# Patient Record
Sex: FEMALE | URBAN - METROPOLITAN AREA
[De-identification: names, ages, dates, MRNs, and addresses within clinical notes are randomized per-mention and may not be internally consistent; named-entity substitution may affect disease eponyms.]

---

## 2017-07-05 LAB — MAMMOGRAM: NORMAL

## 2018-01-15 ENCOUNTER — THERAPY VISIT (OUTPATIENT)
Dept: PHYSICAL THERAPY | Facility: CLINIC | Age: 68
End: 2018-01-15
Payer: MEDICARE

## 2018-01-15 DIAGNOSIS — M25.512 ACUTE PAIN OF LEFT SHOULDER: Primary | ICD-10-CM

## 2018-01-15 PROCEDURE — G8985 CARRY GOAL STATUS: HCPCS | Mod: GP | Performed by: PHYSICAL THERAPIST

## 2018-01-15 PROCEDURE — 97162 PT EVAL MOD COMPLEX 30 MIN: CPT | Mod: GP | Performed by: PHYSICAL THERAPIST

## 2018-01-15 PROCEDURE — G8984 CARRY CURRENT STATUS: HCPCS | Mod: GP | Performed by: PHYSICAL THERAPIST

## 2018-01-15 PROCEDURE — 97110 THERAPEUTIC EXERCISES: CPT | Mod: GP | Performed by: PHYSICAL THERAPIST

## 2018-01-15 NOTE — PROGRESS NOTES
[facility] Initial Evaluation  Subjective:  HPI Comments: [facility] Initial Evaluation.    Mrs. Lei is an active 67 year old retired woman, who spends many hours volunteering. 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 They did not provide box cutters. The next day she felt significant anterior left shoulder pain, which has gotten progressively worse.The patient is right dominant, and has been coping with her shoulder pain since its onset. Mica rates her pain as 8/10. She cannot elevate her arm, and is now having trouble sleeping. She is diabetic, and during a visit with her PA for her blood work - shared this concern. Given a positive drop arm test, severely limited AROM, and PROM > AROM, I have asked Mica to contact her PA for follow up. Concerned if there is a RCT. We will keep this chart open.    Patient is a 67 year old female presenting with rehab left shoulder hpi. The history is provided by the patient. No  was used.   Mica Lei is a 67 year old female with a left shoulder condition.  Condition occurred with:  Repetition/overuse.  Condition occurred: in the community.  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    Patient reports pain:  Anterior and lateral.    Pain is described as aching, stabbing, shooting and sharp and is constant and reported as 8/10.  Associated symptoms:  Loss of strength and loss of motion/stiffness. Pain is the same all the time.  Symptoms are exacerbated by using arm at shoulder level, certain positions, carrying, using arm behind back, lifting and lying on extremity and relieved by heat.  Since onset symptoms are gradually worsening.        General health as reported by patient is good.                  Barriers include:  None as reported by the patient.    Red flags:  None as reported by the  patient.                        Objective:  Standing Alignment:    Cervical/Thoracic:  Forward head  Shoulder/UE:  Rounded shoulders                  Flexibility/Screens:   Positive screens:  ShoulderNegative screens: Cervical                              Shoulder Evaluation:  ROM:  AROM:    Flexion:  Left:  80    Right:  170    Abduction:  Left: 75   Right:  179    Internal Rotation:  Left:  Sacrum    Right:  T7  External Rotation:  Left:  Cannot put hand behind head    Right:  C4                PROM:    Flexion:  Left:  145          Abduction:  Left:  140        Internal Rotation:  Left:  65      External Rotation:  Left:  60                        Strength:    Flexion: Left:3-/5   Pain:    Right: 4/5     Pain:   Extension:  Left: 4-/5    Pain:    Right: 4/5    Pain:  Abduction:  Left: 3-/5  Pain:    Right: 4/5     Pain:    Internal Rotation:  Left:4-/5     Pain:    Right: 4/5     Pain:  External Rotation:   Left:3-/5     Pain:   Right:4/5     Pain:        Elbow Flexion:  Left:4/5     Pain:    Right:4/5     Pain:    Stability Testing:    Left shoulder stability positive testing:  Apprehension    Special Tests:  Special tests assessed shoulder: ? left RCT.  Left shoulder positive for the following special tests:  Impingement and Rotator cuff tear    Palpation:    Left shoulder tenderness present at:  Biceps and Supraspinatus                                                                            Musculoskeletal:        Left shoulder: She exhibits decreased range of motion, pain and decreased strength.        Arms:      ROS    Assessment/Plan:    Patient is a 67 year old female with left side shoulder complaints.    Patient has the following significant findings with corresponding treatment plan.                Diagnosis 1:  Left shoulder pain  Pain -  hot/cold therapy, manual therapy, self management, education and home program  Decreased ROM/flexibility - manual therapy, therapeutic exercise and home  program  Decreased strength - therapeutic exercise and home program  Impaired muscle performance - neuro re-education and home program  Decreased function - home program    Therapy Evaluation Codes:   1) History comprised of:   Personal factors that impact the plan of care:      None.    Comorbidity factors that impact the plan of care are:      Diabetes, Heart problems, High blood pressure, Overweight and kidney disease.     Medications impacting care: Anti-depressant, Cardiac and High blood pressure.  2) Examination of Body Systems comprised of:   Body structures and functions that impact the plan of care:      Shoulder.   Activity limitations that impact the plan of care are:      Bathing, Cooking, Driving, Dressing, Lifting and Sleeping.  3) Clinical presentation characteristics are:   Evolving/Changing.  4) Decision-Making    Moderate complexity using standardized patient assessment instrument and/or measureable assessment of functional outcome.  Cumulative Therapy Evaluation is: Moderate complexity.    Previous and current functional limitations:  (See Goal Flow Sheet for this information)    Short term and Long term goals: (See Goal Flow Sheet for this information)     Communication ability:  Patient appears to be able to clearly communicate and understand verbal and written communication and follow directions correctly.  Treatment Explanation - The following has been discussed with the patient:   RX ordered/plan of care  Anticipated outcomes  Possible risks and side effects  This patient would benefit from PT intervention to resume normal activities.   Rehab potential is fair.    Frequency:  Have asked the patient to return to see her PA for further assessment. Concerned about a RCT. If clear - frequency would be 1x a week, once daily.  Duration:  for 8 weeks  Discharge Plan:  Achieve all LTG.  Independent in home treatment program.  Reach maximal therapeutic benefit.    Please refer to the daily flowsheet  for treatment today, total treatment time and time spent performing 1:1 timed codes.

## 2018-01-15 NOTE — LETTER
DEPARTMENT OF HEALTH AND HUMAN SERVICES  [facility]    PLAN/UPDATED PLAN OF PROGRESS FOR OUTPATIENT REHABILITATION    PATIENTS NAME:  Mica Lei     : 1950    PROVIDER NUMBER:    [id number]    [facility]  [id number]    [facility]    MEDICAL RECORD NUMBER: [id number]     START OF CARE DATE:  SOC Date: 01/15/18   TYPE:  PT    PRIMARY/TREATMENT DIAGNOSIS: (Pertinent Medical Diagnosis)  Acute pain of left shoulder    VISITS FROM START OF CARE:  Rxs Used: 1     [facility] Initial Evaluation  Subjective:  HPI Comments: [facility] Initial [facility].    Mrs. Lei is an active 67 year old retired woman, who spends many hours volunteering. 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 They did not provide box cutters. The next day she felt significant anterior left shoulder pain, which has gotten progressively worse.The patient is right dominant, and has been coping with her shoulder pain since its onset. Mica rates her pain as 8/10. She cannot elevate her arm, and is now having trouble sleeping. She is diabetic, and during a visit with her PA for her blood work - shared this concern. Given a positive drop arm test, severely limited AROM, and PROM > AROM, I have asked Mica to contact her PA for follow up. Concerned if there is a RCT. We will keep this chart open.    Mica Lei is a 67 year old female with a left shoulder condition.  Condition occurred with:  Repetition/overuse.  Condition occurred: in the community.  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    Patient reports pain:  Anterior and lateral.    Pain is described as aching, stabbing, shooting and sharp and is constant and reported as 8/10.  Associated symptoms:  Loss of strength and loss of motion/stiffness. Pain is the same all the time.   Symptoms are exacerbated by using arm at shoulder level, certain positions, carrying, using arm behind back, lifting and lying on extremity and relieved by heat.  Since onset symptoms are gradually worsening.  General health as reported by patient is good.                 Pertinent medical history includes:  Diabetes, overweight, high blood pressure, heart problems, kidney disease and other.  Medical allergies: yes (latex).    Current medications:  Cardiac medication, anti-depressants and high blood pressure medication.  Current occupation is Retired.  Barriers include:  None as reported by the patient.  Red flags:  None as reported by the patient.      PATIENTS NAME:  Mica Lei   : 1950-Page 2                 Objective:  Standing Alignment:    Cervical/Thoracic:  Forward head  Shoulder/UE:  Rounded shoulders  Flexibility/Screens:   Positive screens:  ShoulderNegative screens: Cervical   Shoulder Evaluation:  ROM:  AROM:    Flexion:  Left:  80    Right:  170  Abduction:  Left: 75   Right:  179  Internal Rotation:  Left:  Sacrum    Right:  T7  External Rotation:  Left:  Cannot put hand behind head    Right:  C4  PROM:    Flexion:  Left:  145        Abduction:  Left:  140      Internal Rotation:  Left:  65      External Rotation:  Left:  60      Strength:    Flexion: Left:3-/5   Pain:    Right: 4/5     Pain:   Extension:  Left: 4-/5    Pain:    Right: 4/5    Pain:  Abduction:  Left: 3-/5  Pain:    Right: 4/5     Pain:  Internal Rotation:  Left:4-/5     Pain:    Right: 4/5     Pain:  External Rotation:   Left:3-/5     Pain:   Right:4/5     Pain:    Elbow Flexion:  Left:4/5     Pain:    Right:4/5     Pain:  Stability Testing:    Left shoulder stability positive testing:  Apprehension  Special Tests:  Special tests assessed shoulder: ? left RCT.  Left shoulder positive for the following special tests:  Impingement and Rotator cuff tear  Palpation:    Left shoulder tenderness present at:  Biceps and  Supraspinatus                      Musculoskeletal:        Left shoulder: She exhibits decreased range of motion, pain and decreased strength.        Arms:      PATIENTS NAME:  Mica Lei   : 1950-Page 3      Assessment/Plan:    Patient is a 67 year old female with left side shoulder complaints.    Patient has the following significant findings with corresponding treatment plan.                Diagnosis 1:  Left shoulder pain  Pain -  hot/cold therapy, manual therapy, self management, education and home program  Decreased ROM/flexibility - manual therapy, therapeutic exercise and home program  Decreased strength - therapeutic exercise and home program  Impaired muscle performance - neuro re-education and home program  Decreased function - home program    Therapy Evaluation Codes:   1) History comprised of:   Personal factors that impact the plan of care:      None.    Comorbidity factors that impact the plan of care are:      Diabetes, Heart problems, High blood pressure, Overweight and kidney disease.     Medications impacting care: Anti-depressant, Cardiac and High blood pressure.  2) Examination of Body Systems comprised of:   Body structures and functions that impact the plan of care:      Shoulder.   Activity limitations that impact the plan of care are:      Bathing, Cooking, Driving, Dressing, Lifting and Sleeping.  3) Clinical presentation characteristics are:   Evolving/Changing.  4) Decision-Making    Moderate complexity using standardized patient assessment instrument and/or measureable assessment of functional outcome.  Cumulative Therapy Evaluation is: Moderate complexity.    Previous and current functional limitations:  (See Goal Flow Sheet for this information)    Short term and Long term goals: (See Goal Flow Sheet for this information)     Communication ability:  Patient appears to be able to clearly communicate and understand verbal and written communication and follow directions  "correctly.  Treatment Explanation - The following has been discussed with the patient:   RX ordered/plan of care  Anticipated outcomes  Possible risks and side effects  This patient would benefit from PT intervention to resume normal activities.   Rehab potential is fair.    Frequency:  Have asked the patient to return to see her PA for further assessment. Concerned about a RCT. If clear - frequency would be 1x a week, once daily.  Duration:  for 8 weeks  Discharge Plan:  Achieve all LTG.  Independent in home treatment program.  Reach maximal therapeutic benefit.    PATIENTS NAME:  Mica Lei   : 1950-Page 4      Caregiver Signature/Credentials _____________________________ Date ________       Treating Provider: Frances Otero DPT   I have reviewed and certified the need for these services and plan of treatment while under my care.        PHYSICIAN'S SIGNATURE:   _________________________________________  Date___________   Katelynn Fu PA-C    Certification period:  Beginning of Cert date period: 01/15/18 to  End of Cert period date: 18     Functional Level Progress Report: Please see attached \"Goal Flow sheet for Functional level.\"    ____X____ Continue Services or       ________ DC Services                Service dates: From  SOC Date: 01/15/18 date to present                         "

## 2018-01-16 NOTE — PROGRESS NOTES
[facility] Initial Evaluation  Subjective:  Patient is a 67 year old female presenting with rehab left ankle/foot hpi.                                      Pertinent medical history includes:  Diabetes, overweight, high blood pressure, heart problems, kidney disease and other.  Medical allergies: yes (latex).    Current medications:  Cardiac medication, anti-depressants and high blood pressure medication.  Current occupation is Retired.                                    Objective:  System    Physical Exam    General     ROS    Assessment/Plan:

## 2018-03-14 PROBLEM — M25.512 ACUTE PAIN OF LEFT SHOULDER: Status: RESOLVED | Noted: 2018-01-15 | Resolved: 2018-01-16

## 2018-04-04 ENCOUNTER — TRANSFERRED RECORDS (OUTPATIENT)
Dept: HEALTH INFORMATION MANAGEMENT | Facility: CLINIC | Age: 68
End: 2018-04-04

## 2018-04-06 ENCOUNTER — TRANSFERRED RECORDS (OUTPATIENT)
Dept: HEALTH INFORMATION MANAGEMENT | Facility: CLINIC | Age: 68
End: 2018-04-06

## 2018-07-06 ENCOUNTER — RECORDS - HEALTHEAST (OUTPATIENT)
Dept: LAB | Facility: CLINIC | Age: 68
End: 2018-07-06

## 2018-07-09 LAB
BACTERIA SPEC CULT: ABNORMAL
BACTERIA SPEC CULT: NORMAL
GRAM STAIN RESULT: ABNORMAL

## 2018-07-10 LAB
BACTERIA SPEC CULT: ABNORMAL
BACTERIA SPEC CULT: ABNORMAL
GRAM STAIN RESULT: ABNORMAL
GRAM STAIN RESULT: ABNORMAL

## 2018-08-01 ENCOUNTER — TRANSFERRED RECORDS (OUTPATIENT)
Dept: HEALTH INFORMATION MANAGEMENT | Facility: CLINIC | Age: 68
End: 2018-08-01

## 2018-09-10 ENCOUNTER — TRANSFERRED RECORDS (OUTPATIENT)
Dept: HEALTH INFORMATION MANAGEMENT | Facility: CLINIC | Age: 68
End: 2018-09-10

## 2018-09-19 ENCOUNTER — TELEPHONE (OUTPATIENT)
Dept: DERMATOLOGY | Facility: CLINIC | Age: 68
End: 2018-09-19

## 2018-09-19 NOTE — TELEPHONE ENCOUNTER
Dermatology Pre-visit Call:    Reason for visit : blistering dermatosis     Any personal history of skin cancers: No    Was the patient referred: Yes    If the patient was referred, are records obtained: Yes. (If no, then obtain records).    Has the patient seen a dermatologist in the past: Yes. (If yes, obtain records)    Patient Reminders Given:  --Please, make sure you bring an updated list of your medications.   --Plan on being in our facility for approximately one hour, this includes the registration process, office visit, education and check-out process.  If you are having a procedure, more time may be required.     --If you are having a procedure, please, present 15 minutes early.  --Location reviewed.   --If you need to cancel or reschedule, call XXXX  --We look forward to seeing you in Dermatology Clinic.

## 2018-09-24 ENCOUNTER — PATIENT OUTREACH (OUTPATIENT)
Dept: CARE COORDINATION | Facility: CLINIC | Age: 68
End: 2018-09-24

## 2018-09-25 ENCOUNTER — OFFICE VISIT (OUTPATIENT)
Dept: DERMATOLOGY | Facility: CLINIC | Age: 68
End: 2018-09-25
Payer: COMMERCIAL

## 2018-09-25 DIAGNOSIS — R21 RASH AND NONSPECIFIC SKIN ERUPTION: Primary | ICD-10-CM

## 2018-09-25 RX ORDER — LISINOPRIL 20 MG/1
TABLET ORAL
COMMUNITY
Start: 2018-07-04

## 2018-09-25 RX ORDER — SERTRALINE HYDROCHLORIDE 100 MG/1
TABLET, FILM COATED ORAL
COMMUNITY
Start: 2018-09-15

## 2018-09-25 RX ORDER — FUROSEMIDE 20 MG/1
5 TABLET ORAL DAILY
COMMUNITY
Start: 2018-05-02

## 2018-09-25 RX ORDER — LIRAGLUTIDE 6 MG/ML
INJECTION SUBCUTANEOUS
COMMUNITY
Start: 2018-09-15

## 2018-09-25 RX ORDER — BETAMETHASONE DIPROPIONATE 0.5 MG/G
OINTMENT, AUGMENTED TOPICAL 2 TIMES DAILY
Qty: 50 G | Refills: 0 | Status: SHIPPED | OUTPATIENT
Start: 2018-09-25 | End: 2018-10-31

## 2018-09-25 RX ORDER — METOPROLOL TARTRATE 50 MG
TABLET ORAL
COMMUNITY
Start: 2018-08-20

## 2018-09-25 RX ORDER — ATORVASTATIN CALCIUM 20 MG/1
TABLET, FILM COATED ORAL
COMMUNITY
Start: 2018-08-27

## 2018-09-25 ASSESSMENT — PAIN SCALES - GENERAL: PAINLEVEL: NO PAIN (0)

## 2018-09-25 NOTE — PROGRESS NOTES
"[facility] Dermatology Note      Dermatology Problem List:  # Rash NOS - Leading differential diagnosis is acrodermatitis continua of [place].   - Biopsy 4/2018 showed psoriasiform dermatitis; slides to be obtained for review.  - Start aug betamethasone ointment BID.    Encounter Date: Sep 25, 2018    CC:   Chief Complaint   Patient presents with     Derm Problem     Mica is here today for a sore on her right 3 digit. She states \" I had surgery on the toe and I have been to a few different doctors with not help.\"     History of Present Illness:  Ms. Mica Lei is a 68 year old female who presents as a new patient for evaluation of a skin rash referred by Dr. Tong Goodson and Dr. Terrell Alicia. History is taken from the patient, chart review, and referral notes.     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 Right after the surgery, she noticed skin changes over her 3rd digit initially. She describes initially purple colored skin that then became raw. It also slowly spread over time to her plantar surface. She also reports swelling and drainage. She has remained afebrile and without systemic symptoms. She was started on a short course of oral antibiotics (unclear name) which provided minimal clinical improvement. X-ray of the bone showed mild swelling but otherwise unremarkable. CBC was normal and CRP was mildly elevated at 0.8. Skin biopsy performed April 2018 showed psoriasiform dermatitis, although we do not have the path report for that.    She has been treated by multiple modalities including: topical antifungal/steroid combination, a course of oral antifungals, mid-high potency topical steroids, and vinegar soaks. None of which were very helpful per the patient. She went to an orthopedic surgeon in June or July and had one of the rods removed in the right " "3rd toe in hopes this would help her symptoms but it did not. There was a note that there were \"signs of infection\" during that procedure, but nothing further than that. Cultures taken at that time were negative.     The patient reports no personal history of skin problems. She has no family history of any skin conditions, including psoriasis.    Past Medical History:   Patient Active Problem List   Diagnosis   (none) - all problems resolved or deleted     No past medical history on file.  No past surgical history on file.    Social History:   reports that she has never smoked. She has never used smokeless tobacco.    Family History:  No family history on file.    Medications:  Current Outpatient Prescriptions   Medication Sig Dispense Refill     atorvastatin (LIPITOR) 20 MG tablet        canagliflozin (INVOKANA) 300 MG tablet        furosemide (LASIX) 10 mg TABS half-tab Take 5 mg by mouth daily       Insulin Aspart (NOVOLOG FLEXPEN SC)        Insulin Detemir (LEVEMIR FLEXTOUCH SC)        liraglutide (VICTOZA PEN) 18 MG/3ML soln        lisinopril (PRINIVIL/ZESTRIL) 20 MG tablet        metFORMIN (GLUCOPHAGE) 1000 MG tablet        metoprolol tartrate (LOPRESSOR) 50 MG tablet        sertraline (ZOLOFT) 100 MG tablet           Allergies   Allergen Reactions     Penicillins Hives     Sulfa Drugs Rash       Review of Systems:  -As per HPI  -Constitutional: The patient denies fatigue, fevers, chills, unintended weight loss, and night sweats.  -HEENT: Patient denies nonhealing oral sores.  -Skin: As above in HPI. No additional skin concerns.    Physical exam:  Vitals: There were no vitals taken for this visit.  GEN: This is a well developed, well-nourished female in no acute distress, in a pleasant mood.    SKIN: Focused examination of the hands and feet was performed.  -Erosion over right 3rd digit with some scaling and crusting at erosion border with loss of the nail.   -Scaling erosive plaque with hemorrhagic crusting " at base of right great toe, base of 2nd toe. Small scaling plaque on right plantar surface which was previous biopsy site.  -Longitudinal ridging in bilateral finger nails, no pitting or oil spots.   -No obvious pustules  -No other lesions of concern on areas examined.                 Impression/Plan:  1. 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 Arising after foot surgery on 10/24/2017, which may suggest an element of koebnerization. Previously biopsied 4/2018 showed psoriasiform dermatitis consistent with psoriasis vs chronic eczematous process. Patient has failed multiple treatment modalities including: topical and oral anti-fungals, mid to high potency topical steroids, and oral antibiotics.    We will work to get the pathology report and the slides for evaluation    Will start augmented betamethasone ointment twice a day    Depending on path results, can consider initiation of Humira or cyclosporine at next visit. Briefly reviewed Humira with patient and her .    Follow-up in 1 month.    Dr. Whalen staffed the patient.    Staff Involved:  Resident(Catina Daniels)/Staff(as above)    Staff Physician Comments:   I saw and evaluated the patient with the resident and I agree with the assessment and plan.  I was present for the examination.    Nader Whalen MD  Dermatology Staff Physician  , Department of Dermatology

## 2018-09-25 NOTE — LETTER
"9/25/2018       RE: Mica Lei  [address]  [facility] [address]     Dear Colleague,    Thank you for referring your patient, Mica Lei, to the [facility] DERMATOLOGY at [facility]. Please see a copy of my visit note below.    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 Biopsy 4/2018 showed psoriasiform dermatitis.   -Start aug betamethasone ointment BID, will get outside path report. Plan for Humira at next visit.     Encounter Date: Sep 25, 2018    CC:   Chief Complaint   Patient presents with     Derm Problem     Mica is here today for a sore on her right 3 digit. She states \" I had surgery on the toe and I have been to a few different doctors with not help.\"         History of Present Illness:  Ms. Mica Lei is a 68 year old female who presents as a new patient for evaluation of a skin rash referred by Dr. Tong Goodson and Dr. Terrell Alicia. History is taken from the patient, chart review, and referral notes.     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 Right after the surgery, she noticed skin changes over her 3rd digit initially. She describes initially purple colored skin that then became raw. It also slowly spread over time to her plantar surface. She also reports swelling and drainage. She has remained afebrile and without systemic symptoms. She was started on a short course of oral antibiotics (unclear name) which provided minimal clinical improvement. X-ray of the bone showed mild swelling but otherwise unremarkable. CBC was normal and CRP was mildly elevated at 0.8. Skin biopsy performed April 2018 showed psoriasiform dermatitis, although we do not have the path report for that.    She has been treated by " "multiple modalities including: topical antifungal/steroid combination, a course of oral antifungals, mid-high potency topical steroids, and vinegar soaks. None of which were very helpful per the patient. She went to an orthopedic surgeon in June or July and had one of the rods removed in the right 3rd toe in hopes this would help her symptoms but it did not. There was a note that there were \"signs of infection\" during that procedure, but nothing further than that. Cultures taken at that time were negative.     The patient reports no personal history of skin problems. She has no family history of any skin conditions, including psoriasis.    Past Medical History:   Patient Active Problem List   Diagnosis   (none) - all problems resolved or deleted     No past medical history on file.  No past surgical history on file.    Social History:   reports that she has never smoked. She has never used smokeless tobacco.    Family History:  No family history on file.    Medications:  Current Outpatient Prescriptions   Medication Sig Dispense Refill     atorvastatin (LIPITOR) 20 MG tablet        canagliflozin (INVOKANA) 300 MG tablet        furosemide (LASIX) 10 mg TABS half-tab Take 5 mg by mouth daily       Insulin Aspart (NOVOLOG FLEXPEN SC)        Insulin Detemir (LEVEMIR FLEXTOUCH SC)        liraglutide (VICTOZA PEN) 18 MG/3ML soln        lisinopril (PRINIVIL/ZESTRIL) 20 MG tablet        metFORMIN (GLUCOPHAGE) 1000 MG tablet        metoprolol tartrate (LOPRESSOR) 50 MG tablet        sertraline (ZOLOFT) 100 MG tablet           Allergies   Allergen Reactions     Penicillins Hives     Sulfa Drugs Rash       Review of Systems:  -As per HPI  -Constitutional: The patient denies fatigue, fevers, chills, unintended weight loss, and night sweats.  -HEENT: Patient denies nonhealing oral sores.  -Skin: As above in HPI. No additional skin concerns.    Physical exam:  Vitals: There were no vitals taken for this visit.  GEN: This is a " well developed, well-nourished female in no acute distress, in a pleasant mood.    SKIN: Focused examination of the hands and feet was performed.  -Erosion over right 3rd digit with some scaling and crusting at erosion border with loss of the nail.   -Scaling erosive plaque with hemorrhagic crusting at base of right great toe, base of 2nd toe. Small scaling plaque on right plantar surface which was previous biopsy site.  -Longitudinal ridging in bilateral finger nails, no pitting or oil spots.   -No other lesions of concern on areas examined.                 Impression/Plan:  1. 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 Previously biopsied 4/2018 showed psoriasiform dermatitis consistent with psoriasis vs chronic eczematous process. Patient has failed multiple treatment modalities including: topical and oral anti-fungals, mid to high potency topical steroids, and oral antibiotics.    We will work to get the pathology report and the slides for evaluation    Will start augmented betamethasone ointment twice a day    Depending on path results, can consider initiation of Humira at next visit. Briefly reviewed Humira with patient and her .      Follow-up in 1 month.      Dr. Whalen staffed the patient.    Staff Involved:  Resident(Catina Daniels)/Staff(as above)      Again, thank you for allowing me to participate in the care of your patient.      Sincerely,    Nader Whalen MD

## 2018-09-25 NOTE — NURSING NOTE
"Chief Complaint   Patient presents with     Derm Problem     Mica is here today for a sore on her right 3 digit. She states \" I had surgery on the toe and I have been to a few different doctors with not help.\"     Lelo Gilliland, RMMONICA  "

## 2018-09-25 NOTE — MR AVS SNAPSHOT
After Visit Summary   9/25/2018    Mica Lei    MRN: [id number]           Patient Information     Date Of Birth          1950        Visit Information        Provider Department      9/25/2018 12:25 PM Nader Whalen MD M [facility] Dermatology        Today's Diagnoses     Rash and nonspecific skin eruption    -  1       Follow-ups after your visit        Your next 10 appointments already scheduled     Oct 30, 2018 11:55 AM CDT   (Arrive by 11:40 AM)   Return Visit with MD FARZAD Reyes [facility] Dermatology ([facility])    [address] [place] [address] [phone]              Who to contact     Please call your clinic at [phone] to:    Ask questions about your health    Make or cancel appointments    Discuss your medicines    Learn about your test results    Speak to your doctor            Additional Information About Your Visit        MyChart Information     [employer] gives you secure access to your electronic health record. If you see a primary care provider, you can also send messages to your care team and make appointments. If you have questions, please call your primary care clinic.  If you do not have a primary care provider, please call [phone] and they will assist you.      [facility] is an electronic gateway that provides easy, online access to your medical records. With [facility], you can request a clinic appointment, read your test results, renew a prescription or communicate with your care team.     To access your existing account, please contact your [facility] or call [phone] for assistance.        Care EveryWhere ID     This is your Care EveryWhere ID. This could be used by other organizations to access your [place] medical records  [id number]         Blood Pressure from Last 3 Encounters:   No data found for BP    Weight from Last 3 Encounters:   No data found for Wt               Today, you had the following     No orders found for display         Today's Medication Changes          These changes are accurate as of 9/25/18  1:15 PM.  If you have any questions, ask your nurse or doctor.               Start taking these medicines.        Dose/Directions    augmented betamethasone dipropionate 0.05 % ointment   Commonly known as:  DIPROLENE-AF   Used for:  Rash and nonspecific skin eruption   Started by:  Nader Whalen MD        Apply topically 2 times daily   Quantity:  50 g   Refills:  0            Where to get your medicines      These medications were sent to [facility] [address] [place] [address] [address] [facility]  [address] [facility] [address] [facility] [address]     Phone:  [phone]     augmented betamethasone dipropionate 0.05 % ointment                Primary Care Provider Office Phone # Fax #    Katelynn Tank [phone]       [address] [address]        Equal Access to Services     SARITA [facility] CenterSALAZAR AH: Hadii aad ku hadasho Soomaali, waaxda luqadaha, qaybta kaalmada adeegyada, waxay idiin hayshayyn bud dockery . So [facility] [phone].    ATENCIÓN: Si habla español, tiene a sumner disposición servicios gratuitos de asistencia lingüística. [facility] [phone].    We comply with applicable federal civil rights laws and [place] laws. We do not discriminate on the basis of race, color, national origin, age, disability, sex, sexual orientation, or gender identity.            Thank you!     Thank you for choosing [facility] DERMATOLOGY  for your care. Our goal is always to provide you with excellent care. Hearing back from our patients is one way we can continue to improve our services. Please take a few minutes to complete the written survey that you may receive in the mail after your visit with us. Thank you!             Your Updated Medication List - Protect others around you: Learn how to safely use, store  and throw away your medicines at www.disposemymeds.org.          This list is accurate as of 9/25/18  1:15 PM.  Always use your most recent med list.                   Brand Name Dispense Instructions for use Diagnosis    atorvastatin 20 MG tablet    LIPITOR          augmented betamethasone dipropionate 0.05 % ointment    DIPROLENE-AF    50 g    Apply topically 2 times daily    Rash and nonspecific skin eruption       furosemide 10 mg Tabs half-tab    LASIX     Take 5 mg by mouth daily        INVOKANA 300 MG tablet   Generic drug:  canagliflozin           LEVEMIR FLEXTOUCH SC           lisinopril 20 MG tablet    PRINIVIL/ZESTRIL          metFORMIN 1000 MG tablet    GLUCOPHAGE          metoprolol tartrate 50 MG tablet    LOPRESSOR          NOVOLOG FLEXPEN SC           sertraline 100 MG tablet    ZOLOFT          VICTOZA PEN 18 MG/3ML soln   Generic drug:  liraglutide

## 2018-10-12 ENCOUNTER — TELEPHONE (OUTPATIENT)
Dept: DERMATOLOGY | Facility: CLINIC | Age: 68
End: 2018-10-12

## 2018-10-12 NOTE — TELEPHONE ENCOUNTER
[facility]    Phone Message    May a detailed message be left on voicemail: yes    Reason for Call: Other: Dr. Curry from [facility] called wanting to touch base w/ Dr. Whalen about their mutual. Dr. Curry stated pt had a skin reaction to a surgery (not done by him) where she was referred to us, and he wanted to collaborate w/ Dr. Whalen to see how to best treat pt moving forward.     Action Taken: Message routed to:  [facility] ([facility] Derm

## 2018-10-12 NOTE — TELEPHONE ENCOUNTER
I called Dr. Curry and I let him know that Dr. Whalen would not be back in the offie till Tuesday, he stated that it was fine and he looks forward to talking with him then.   Mar Rosas, CMA

## 2018-10-30 ENCOUNTER — OFFICE VISIT (OUTPATIENT)
Dept: DERMATOLOGY | Facility: CLINIC | Age: 68
End: 2018-10-30
Payer: COMMERCIAL

## 2018-10-30 DIAGNOSIS — L30.9 DERMATITIS: Primary | ICD-10-CM

## 2018-10-30 PROBLEM — E11.9 DIABETES (H): Status: ACTIVE | Noted: 2018-10-30

## 2018-10-30 ASSESSMENT — PAIN SCALES - GENERAL: PAINLEVEL: NO PAIN (0)

## 2018-10-30 NOTE — NURSING NOTE
Dermatology Rooming Note    Mica Lei's goals for this visit include:   Chief Complaint   Patient presents with     Derm Problem    Karla Garcia LPN

## 2018-10-30 NOTE — LETTER
"10/30/2018       RE: Mica Lei  [address] [address] [address]     Dear Colleague,    Thank you for referring your patient, Mica Lei, to the [facility] DERMATOLOGY at [facility]. Please see a copy of my visit note below.    [facility] Dermatology Note      Dermatology Problem List:  1. 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   - Betamethasone ointment BID started 9/25/2018    Encounter Date: Oct 30, 2018    CC:  Chief Complaint   Patient presents with     Derm Problem     Mica is here for follow up with her toe.          History of Present Illness:  Ms. Mica Lei is a 68 year old female with a past medical history significant for Type 2 diabetes who presents as a follow-up for a right foot lesion that developed after arthrodesis of the 3rd, 4th, and 5th digits for hammertoes by podiatry on October 24, 2017. The patient was last seen 9/25/2018 when she was started on betamethasone ointment twice daily and initiation of treatment with Humira or cyclosporine was discussed.    Today, Mica states that the lesions on her foot are a little bit better. She has fewer open sores, particularly over the right great toe and base of the 2nd toe. However, the 3rd toe still bleeds every morning and it was entirely purple this morning. She has also noticed some foul odors coming from the toe. She stated that her feet are extremely dry and that she applies GoldBond Ultimate lotion for diabetes. She states that her diabetes is \"finally under control\" on insulin and metformin. She would prefer not to start treatment with Humira or cyclosporine given possible side effects and desire to minimize medication regimen.     No other questions or concerns.      Past Medical History:   Patient Active Problem List   Diagnosis   (none) - all " problems resolved or deleted     History reviewed. No pertinent past medical history.  History reviewed. No pertinent surgical history.    Social History:  Patient  reports that she has never smoked. She has never used smokeless tobacco.    Family History:  History reviewed. No pertinent family history.    Medications:  Current Outpatient Prescriptions   Medication Sig Dispense Refill     atorvastatin (LIPITOR) 20 MG tablet        augmented betamethasone dipropionate (DIPROLENE-AF) 0.05 % ointment Apply topically 2 times daily 50 g 0     canagliflozin (INVOKANA) 300 MG tablet        furosemide (LASIX) 10 mg TABS half-tab Take 5 mg by mouth daily       Insulin Aspart (NOVOLOG FLEXPEN SC)        Insulin Detemir (LEVEMIR FLEXTOUCH SC)        liraglutide (VICTOZA PEN) 18 MG/3ML soln        lisinopril (PRINIVIL/ZESTRIL) 20 MG tablet        metFORMIN (GLUCOPHAGE) 1000 MG tablet        metoprolol tartrate (LOPRESSOR) 50 MG tablet        sertraline (ZOLOFT) 100 MG tablet        Allergies   Allergen Reactions     Penicillins Hives     Sulfa Drugs Rash         Review of Systems:  -As per HPI  -Constitutional: The patient denies fatigue, fevers, chills, unintended weight loss, and night sweats.  -HEENT: Patient denies nonhealing oral sores.  -Skin: As above in HPI. No additional skin concerns.    Physical exam:  Vitals: There were no vitals taken for this visit.  GEN: This is a well developed, well-nourished female in no acute distress, in a pleasant mood.    SKIN: Focused examination of the right foot, arms, legs, and back was performed.  - Erosion over the right 3rd digit on the superior, dorsal, and lateral aspects of the toe. Plantar surface of the superior 3rd toe is hemorrhagic with crusting and scaling at the erosion borders. Digit is purple in color and blanchable with pressure.  - Scaling plaque with superficial crusting at base of right great toe and base of 2nd toe, improved in appearance compared to photos from  "last visit in September, less hemorrhagic.  - No obvious pustules.  - Several linear, white patches on the right extensor forearm, consistent with scarring \"from the iron\" per patient's report.  - Smooth, purple, raised plaques present on the left extensor forearm, consistent with \"old blisters\" from a previous sunburn this summer per patient's report.  - No other lesions of concern on areas examined.    Impression/Plan:  1. 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 Arising after foot surgery on 10/24/2017, which may suggest an element of koebnerization. Previously biopsied 4/2018 showed psoriasiform dermatitis consistent with psoriasis vs chronic eczematous process. Patient has failed multiple treatment modalities including: topical and oral anti-fungals, mid to high potency topical steroids, and oral antibiotics.    Continue beclomethasone ointment twice daily.    Start daily vinegar soaks to prevent secondary bacterial infection.    Further discussed initiation of Humira or cyclosporine treatment, including risks and benefits. Noted communication with patient's orthopedic surgeon and main risk including loss of the digit. Patient is hesitant to proceed with this treatment and would prefer to hold off for now.     Plan to present case at afternoon [place] on November 28th.    Following up via phone once pathology slides have been obtained.    Follow-up in 6 weeks, earlier for new or changing lesions.     Staff Involved:  I, Brittani Soliz MS3, saw and examined the patient in the presence of Dr. Nader Whalen.    Staff Physician:  I was present with the medical student who participated in the service and in the documentation of the note. I have verified the history and personally performed the physical exam and medical decision making. I agree with the assessment and plan of care as documented in " the note.     Nader Whalen MD  Staff Dermatologist and Dermatopathologist  , Department of Dermatology

## 2018-10-30 NOTE — MR AVS SNAPSHOT
After Visit Summary   10/30/2018    Mica Lei    MRN: [id number]           Patient Information     Date Of Birth          1950        Visit Information        Provider Department      10/30/2018 11:55 AM Nader Whalen MD M [facility] Dermatology         Follow-ups after your visit        Your next 10 appointments already scheduled     Dec 11, 2018  9:40 AM CST   (Arrive by 9:25 AM)   Return Visit with Nader Whalen MD   [facility] Dermatology ([email or web address] and [facility] [place])    [address] [place] [address] [phone]              Who to contact     Please call your clinic at [phone] to:    Ask questions about your health    Make or cancel appointments    Discuss your medicines    Learn about your test results    Speak to your doctor            Additional Information About Your Visit        MyChart Information     [employer] gives you secure access to your electronic health record. If you see a primary care provider, you can also send messages to your care team and make appointments. If you have questions, please call your primary care clinic.  If you do not have a primary care provider, please call [phone] and they will assist you.      [employer] is an electronic gateway that provides easy, online access to your medical records. With [employer], you can request a clinic appointment, read your test results, renew a prescription or communicate with your care team.     To access your existing account, please contact your [facility] or call [phone] for assistance.        Care EveryWhere ID     This is your Care EveryWhere ID. This could be used by other organizations to access your [place] medical records  [id number]         Blood Pressure from Last 3 Encounters:   No data found for BP    Weight from Last 3 Encounters:   No data found for Wt              Today, you had the following     No orders found for  display       Primary Care Provider Office Phone # Fax #    Katelynn Fu [phone]       [address] [address]        Equal Access to Services     CHAYO TSANG : Hadii aad ku hadlevio Sojohannyali, waaxda luqadaha, qaybta kaalmada adejuan, mark anthony bustillolucio robertsann sierra sarkis espinoza. So [facility] [phone].    ATENCIÓN: Si habla español, tiene a sumner disposición servicios gratuitos de asistencia lingüística. [facility] [phone].    We comply with applicable federal civil rights laws and [place] laws. We do not discriminate on the basis of race, color, national origin, age, disability, sex, sexual orientation, or gender identity.            Thank you!     Thank you for choosing [facility] DERMATOLOGY  for your care. Our goal is always to provide you with excellent care. Hearing back from our patients is one way we can continue to improve our services. Please take a few minutes to complete the written survey that you may receive in the mail after your visit with us. Thank you!             Your Updated Medication List - Protect others around you: Learn how to safely use, store and throw away your medicines at www.disposemymeds.org.          This list is accurate as of 10/30/18 12:43 PM.  Always use your most recent med list.                   Brand Name Dispense Instructions for use Diagnosis    atorvastatin 20 MG tablet    LIPITOR          augmented betamethasone dipropionate 0.05 % ointment    DIPROLENE-AF    50 g    Apply topically 2 times daily    Rash and nonspecific skin eruption       furosemide 10 mg Tabs half-tab    LASIX     Take 5 mg by mouth daily        INVOKANA 300 MG tablet   Generic drug:  canagliflozin           LEVEMIR FLEXTOUCH SC           lisinopril 20 MG tablet    PRINIVIL/ZESTRIL          metFORMIN 1000 MG tablet    GLUCOPHAGE          metoprolol tartrate 50 MG tablet    LOPRESSOR          NOVOLOG FLEXPEN SC           sertraline 100 MG tablet     ZOLOFT          VICTOZA PEN 18 MG/3ML soln   Generic drug:  liraglutide

## 2018-10-30 NOTE — PROGRESS NOTES
"[facility] Dermatology Note      Dermatology Problem List:  1. 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   - Betamethasone ointment BID started 9/25/2018    Encounter Date: Oct 30, 2018    CC:  Chief Complaint   Patient presents with     Derm Problem     Mica is here for follow up with her toe.          History of Present Illness:  Ms. Mica Lei is a 68 year old female with a past medical history significant for Type 2 diabetes who presents as a follow-up for a right foot lesion that developed after arthrodesis of the 3rd, 4th, and 5th digits for hammertoes by podiatry on October 24, 2017. The patient was last seen 9/25/2018 when she was started on betamethasone ointment twice daily and initiation of treatment with Humira or cyclosporine was discussed.    Today, Mica states that the lesions on her foot are a little bit better. She has fewer open sores, particularly over the right great toe and base of the 2nd toe. However, the 3rd toe still bleeds every morning and it was entirely purple this morning. She has also noticed some foul odors coming from the toe. She stated that her feet are extremely dry and that she applies GoldBond Ultimate lotion for diabetes. She states that her diabetes is \"finally under control\" on insulin and metformin. She would prefer not to start treatment with Humira or cyclosporine given possible side effects and desire to minimize medication regimen.     No other questions or concerns.      Past Medical History:   Patient Active Problem List   Diagnosis   (none) - all problems resolved or deleted     History reviewed. No pertinent past medical history.  History reviewed. No pertinent surgical history.    Social History:  Patient  reports that she has never smoked. She has never used smokeless tobacco.    Family History:  History reviewed. No pertinent " "family history.    Medications:  Current Outpatient Prescriptions   Medication Sig Dispense Refill     atorvastatin (LIPITOR) 20 MG tablet        augmented betamethasone dipropionate (DIPROLENE-AF) 0.05 % ointment Apply topically 2 times daily 50 g 0     canagliflozin (INVOKANA) 300 MG tablet        furosemide (LASIX) 10 mg TABS half-tab Take 5 mg by mouth daily       Insulin Aspart (NOVOLOG FLEXPEN SC)        Insulin Detemir (LEVEMIR FLEXTOUCH SC)        liraglutide (VICTOZA PEN) 18 MG/3ML soln        lisinopril (PRINIVIL/ZESTRIL) 20 MG tablet        metFORMIN (GLUCOPHAGE) 1000 MG tablet        metoprolol tartrate (LOPRESSOR) 50 MG tablet        sertraline (ZOLOFT) 100 MG tablet        Allergies   Allergen Reactions     Penicillins Hives     Sulfa Drugs Rash         Review of Systems:  -As per HPI  -Constitutional: The patient denies fatigue, fevers, chills, unintended weight loss, and night sweats.  -HEENT: Patient denies nonhealing oral sores.  -Skin: As above in HPI. No additional skin concerns.    Physical exam:  Vitals: There were no vitals taken for this visit.  GEN: This is a well developed, well-nourished female in no acute distress, in a pleasant mood.    SKIN: Focused examination of the right foot, arms, legs, and back was performed.  - Erosion over the right 3rd digit on the superior, dorsal, and lateral aspects of the toe. Plantar surface of the superior 3rd toe is hemorrhagic with crusting and scaling at the erosion borders. Digit is purple in color and blanchable with pressure.  - Scaling plaque with superficial crusting at base of right great toe and base of 2nd toe, improved in appearance compared to photos from last visit in September, less hemorrhagic.  - No obvious pustules.  - Several linear, white patches on the right extensor forearm, consistent with scarring \"from the iron\" per patient's report.  - Smooth, purple, raised plaques present on the left extensor forearm, consistent with \"old " "blisters\" from a previous sunburn this summer per patient's report.  - No other lesions of concern on areas examined.    Impression/Plan:  1. 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 Arising after foot surgery on 10/24/2017, which may suggest an element of koebnerization. Previously biopsied 4/2018 showed psoriasiform dermatitis consistent with psoriasis vs chronic eczematous process. Patient has failed multiple treatment modalities including: topical and oral anti-fungals, mid to high potency topical steroids, and oral antibiotics.    Continue beclomethasone ointment twice daily.    Start daily vinegar soaks to prevent secondary bacterial infection.    Further discussed initiation of Humira or cyclosporine treatment, including risks and benefits. Noted communication with patient's orthopedic surgeon and main risk including loss of the digit. Patient is hesitant to proceed with this treatment and would prefer to hold off for now.     Plan to present case at afternoon [place] on November 28th.    Following up via phone once pathology slides have been obtained.    Follow-up in 6 weeks, earlier for new or changing lesions.     Staff Involved:  I, Brittani Soliz MS3, saw and examined the patient in the presence of Dr. Nader Whalen.    Staff Physician:  I was present with the medical student who participated in the service and in the documentation of the note. I have verified the history and personally performed the physical exam and medical decision making. I agree with the assessment and plan of care as documented in the note.     Nader Whalen MD  Staff Dermatologist and Dermatopathologist  , Department of Dermatology    "

## 2018-10-31 DIAGNOSIS — R21 RASH AND NONSPECIFIC SKIN ERUPTION: ICD-10-CM

## 2018-11-01 RX ORDER — BETAMETHASONE DIPROPIONATE 0.5 MG/G
OINTMENT, AUGMENTED TOPICAL
Qty: 50 G | Refills: 5 | Status: SHIPPED | OUTPATIENT
Start: 2018-11-01

## 2019-10-11 ASSESSMENT — MIFFLIN-ST. JEOR: SCORE: 1588.16

## 2019-10-14 ENCOUNTER — ANESTHESIA - HEALTHEAST (OUTPATIENT)
Dept: SURGERY | Facility: HOSPITAL | Age: 69
End: 2019-10-14

## 2019-10-14 ENCOUNTER — SURGERY - HEALTHEAST (OUTPATIENT)
Dept: SURGERY | Facility: HOSPITAL | Age: 69
End: 2019-10-14

## 2019-10-14 ASSESSMENT — MIFFLIN-ST. JEOR: SCORE: 1588.16

## 2019-11-06 ENCOUNTER — HEALTH MAINTENANCE LETTER (OUTPATIENT)
Age: 69
End: 2019-11-06

## 2020-01-31 ASSESSMENT — MIFFLIN-ST. JEOR: SCORE: 1588.16

## 2020-02-04 ENCOUNTER — ANESTHESIA - HEALTHEAST (OUTPATIENT)
Dept: SURGERY | Facility: HOSPITAL | Age: 70
End: 2020-02-04

## 2020-02-04 ENCOUNTER — SURGERY - HEALTHEAST (OUTPATIENT)
Dept: SURGERY | Facility: HOSPITAL | Age: 70
End: 2020-02-04

## 2020-02-16 ENCOUNTER — HEALTH MAINTENANCE LETTER (OUTPATIENT)
Age: 70
End: 2020-02-16

## 2020-06-12 ENCOUNTER — AMBULATORY - HEALTHEAST (OUTPATIENT)
Dept: SURGERY | Facility: HOSPITAL | Age: 70
End: 2020-06-12

## 2020-06-12 DIAGNOSIS — Z11.59 ENCOUNTER FOR SCREENING FOR OTHER VIRAL DISEASES: ICD-10-CM

## 2020-06-14 ENCOUNTER — AMBULATORY - HEALTHEAST (OUTPATIENT)
Dept: FAMILY MEDICINE | Facility: CLINIC | Age: 70
End: 2020-06-14

## 2020-06-14 DIAGNOSIS — Z11.59 ENCOUNTER FOR SCREENING FOR OTHER VIRAL DISEASES: ICD-10-CM

## 2020-06-16 ENCOUNTER — ANESTHESIA - HEALTHEAST (OUTPATIENT)
Dept: SURGERY | Facility: HOSPITAL | Age: 70
End: 2020-06-16

## 2020-06-17 ENCOUNTER — SURGERY - HEALTHEAST (OUTPATIENT)
Dept: SURGERY | Facility: HOSPITAL | Age: 70
End: 2020-06-17

## 2020-11-29 ENCOUNTER — HEALTH MAINTENANCE LETTER (OUTPATIENT)
Age: 70
End: 2020-11-29

## 2021-04-10 ENCOUNTER — HEALTH MAINTENANCE LETTER (OUTPATIENT)
Age: 71
End: 2021-04-10

## 2021-06-02 NOTE — ANESTHESIA PREPROCEDURE EVALUATION
Anesthesia Evaluation      History of anesthetic complications (PONV)     Airway   Mallampati: I  Neck ROM: full   Pulmonary     breath sounds clear to auscultation  (-) not a smoker                         Cardiovascular   Exercise tolerance: > or = 4 METS  (+) hypertension, CAD (s/p remote PCI, stable, no angina\), , hypercholesterolemia,     ECG reviewed  Rhythm: regular  Rate: normal,         Neuro/Psych      Endo/Other    (+) diabetes mellitus type 2 using insulin, obesity (BMI 32),      GI/Hepatic/Renal      Comments: Celiac disease          Dental    (+) caps                       Anesthesia Plan  Planned anesthetic: general LMA and total IV anesthesia  LMA 4  Ketamine 25mg post-induction  Local per surgeon - will avoid pop/saph blocks given bilateral nature of procedure (RIGHT PARTIAL HALLUX AMPUTATION, (Right Foot)      LEFT  3RD AND 4TH PERCUTANEUS FLEXOR TENDON RELEASES (Left Foot))  PONV Hx - Decadron 4, Zofran 4, Propofol TIVA  ASA 3     Anesthetic plan and risks discussed with: patient and spouse  Anesthesia plan special considerations: antiemetics,   Post-op plan: routine recovery

## 2021-06-02 NOTE — ANESTHESIA POSTPROCEDURE EVALUATION
Patient: Mica Lei  RIGHT PARTIAL HALLUX AMPUTATION,, LEFT  3RD AND 4TH PERCUTANEUS FLEXOR TENDON RELEASES  Anesthesia type: general    Patient location: [facility] Surgical Floor  Last vitals:   Vitals Value Taken Time   /64 10/15/2019  7:04 AM   Temp 36.6  C (97.8  F) 10/15/2019  7:04 AM   Pulse 61 10/15/2019  7:04 AM   Resp 18 10/15/2019  7:04 AM   SpO2 98 % 10/15/2019  7:04 AM     Post vital signs: stable  Level of consciousness: awake  Post-anesthesia pain: pain controlled  Post-anesthesia nausea and vomiting: no  Pulmonary: unassisted, return to baseline  Cardiovascular: stable and blood pressure at baseline  Hydration: adequate  Anesthetic events: yes: dental injury    QCDR Measures:  ASA# 11 - Afia-op Cardiac Arrest: ASA11B - Patient did NOT experience unanticipated cardiac arrest  ASA# 12 - Afia-op Mortality Rate: ASA12B - Patient did NOT die  ASA# 13 - PACU Re-Intubation Rate: ASA13B - Patient did NOT require a new airway mgmt  ASA# 10 - Composite Anes Safety: ASA10A - No serious adverse event    Additional Notes:Had lower right molar with temporary implant and post sticking out of it.  Post is now gone.

## 2021-06-02 NOTE — ANESTHESIA CARE TRANSFER NOTE
Last vitals:   Vitals:    10/14/19 1635   BP: (P) 123/59   Pulse: (P) 67   Resp: (P) 16   Temp: (P) 36.6  C (97.8  F)   SpO2: (P) 100%     Patient's level of consciousness is drowsy  Spontaneous respirations: yes  Maintains airway independently: yes  Dentition unchanged: yes  Oropharynx: oropharynx clear of all foreign objects    QCDR Measures:  ASA# 20 - Surgical Safety Checklist: WHO surgical safety checklist completed prior to induction    PQRS# 430 - Adult PONV Prevention: 4558F - Pt received => 2 anti-emetic agents (different classes) preop & intraop  ASA# 8 - Peds PONV Prevention: NA - Not pediatric patient, not GA or 2 or more risk factors NOT present  PQRS# 424 - Afia-op Temp Management: 4559F - At least one body temp DOCUMENTED => 35.5C or 95.9F within required timeframe  PQRS# 426 - PACU Transfer Protocol: - Transfer of care checklist used  ASA# 14 - Acute Post-op Pain: ASA14B - Patient did NOT experience pain >= 7 out of 10

## 2021-06-03 VITALS — HEIGHT: 70 IN | BODY MASS INDEX: 31.5 KG/M2 | WEIGHT: 220 LBS

## 2021-06-04 VITALS — WEIGHT: 220 LBS | HEIGHT: 70 IN | BODY MASS INDEX: 31.5 KG/M2

## 2021-06-04 VITALS — HEIGHT: 70 IN | BODY MASS INDEX: 32.7 KG/M2 | WEIGHT: 228.4 LBS

## 2021-06-05 NOTE — ANESTHESIA POSTPROCEDURE EVALUATION
Patient: Mica Lei  Procedure(s):  LEFT FOOT 1ST/2ND TOE INCISION AND DRAINAGE WITH NAIL REMOVAL (Left)  Anesthesia type: general    Patient location: Phase II Recovery  Last vitals:   Vitals Value Taken Time   /67 2/4/2020 11:45 AM   Temp 36.6  C (97.8  F) 2/4/2020 11:24 AM   Pulse 68 2/4/2020 11:53 AM   Resp 26 2/4/2020 11:53 AM   SpO2 99 % 2/4/2020 11:53 AM   Vitals shown include unvalidated device data.  Post vital signs: stable  Level of consciousness: awake and responds to simple questions  Post-anesthesia pain: pain controlled  Post-anesthesia nausea and vomiting: no  Pulmonary: unassisted, return to baseline  Cardiovascular: stable and blood pressure at baseline  Hydration: adequate  Anesthetic events: no    QCDR Measures:  ASA# 11 - Afia-op Cardiac Arrest: ASA11B - Patient did NOT experience unanticipated cardiac arrest  ASA# 12 - Afia-op Mortality Rate: ASA12B - Patient did NOT die  ASA# 13 - PACU Re-Intubation Rate: NA - No ETT / LMA used for case  ASA# 10 - Composite Anes Safety: ASA10A - No serious adverse event    Additional Notes:

## 2021-06-05 NOTE — ANESTHESIA PREPROCEDURE EVALUATION
Anesthesia Evaluation      History of anesthetic complications (PONV)     Airway   Mallampati: I  Neck ROM: full   Pulmonary     breath sounds clear to auscultation  (-) not a smoker                         Cardiovascular   Exercise tolerance: > or = 4 METS  (+) hypertension, CAD (s/p remote PCI, stable, no angina\), , hypercholesterolemia,     ECG reviewed  Rhythm: regular  Rate: normal,         Neuro/Psych      Endo/Other    (+) diabetes mellitus type 2 using insulin, obesity (BMI 32),      GI/Hepatic/Renal      Comments: Celiac disease          Dental    (+) caps                         Anesthesia Plan  Planned anesthetic: general LMA and peripheral nerve block  LMA     ASA 3   Induction: intravenous   Anesthetic plan and risks discussed with: patient and spouse  Anesthesia plan special considerations: antiemetics,   Post-op plan: routine recovery

## 2021-06-05 NOTE — ANESTHESIA PROCEDURE NOTES
Peripheral Block    Patient location during procedure: pre-op  Start time: 2/4/2020 10:19 AM  End time: 2/4/2020 10:23 AM  post-op analgesia per surgeon order as noted in medical record  Staffing:  Performing  Anesthesiologist: Allan Valdez MD  Preanesthetic Checklist  Completed: patient identified, site marked, risks, benefits, and alternatives discussed, timeout performed, consent obtained, airway assessed, oxygen available, suction available, emergency drugs available and hand hygiene performed  Peripheral Block  Block type: sciatic, popliteal  Prep: ChloraPrep  Patient position: sitting  Patient monitoring: cardiac monitor, continuous pulse oximetry, heart rate and blood pressure  Laterality: left  Injection technique: ultrasound guided    Ultrasound used to visualize needle placement in proximity to nerve being blocked: yes   US used to visualize anesthetic spread  Visualized anatomic structures normal  No Pathological Findings  Permanent ultrasound image captured for medical record    Needle  Needle type: Stimuplex   Needle gauge: 20G  Needle length: 4 in  no peripheral nerve catheter placed  Assessment  Injection assessment: no difficulty with injection, negative aspiration for heme, no paresthesia on injection and incremental injection           Eucrisa Pregnancy And Lactation Text: This medication has not been assigned a Pregnancy Risk Category but animal studies failed to show danger with the topical medication. It is unknown if the medication is excreted in breast milk.

## 2021-06-05 NOTE — ANESTHESIA CARE TRANSFER NOTE
Last vitals:   Vitals:    02/04/20 1043   BP: 132/74   Pulse: 76   Resp: 22   Temp:    SpO2: 97%     Patient's level of consciousness is awake  Spontaneous respirations: yes  Maintains airway independently: yes  Dentition unchanged: yes  Oropharynx: oropharynx clear of all foreign objects    QCDR Measures:  ASA# 20 - Surgical Safety Checklist: WHO surgical safety checklist completed prior to induction    PQRS# 430 - Adult PONV Prevention: 4558F - Pt received => 2 anti-emetic agents (different classes) preop & intraop  ASA# 8 - Peds PONV Prevention: NA - Not pediatric patient, not GA or 2 or more risk factors NOT present  PQRS# 424 - Afia-op Temp Management: NA - MAC anesthesia or case < 60 minutes  PQRS# 426 - PACU Transfer Protocol: - Transfer of care checklist used  ASA# 14 - Acute Post-op Pain: ASA14B - Patient did NOT experience pain >= 7 out of 10

## 2021-06-05 NOTE — ANESTHESIA PROCEDURE NOTES
Peripheral Block    Patient location during procedure: pre-op  Start time: 2/4/2020 10:24 AM  End time: 2/4/2020 10:27 AM  post-op analgesia per surgeon order as noted in medical record  Staffing:  Performing  Anesthesiologist: Allan Valdez MD  Preanesthetic Checklist  Completed: patient identified, site marked, risks, benefits, and alternatives discussed, timeout performed, consent obtained, airway assessed, oxygen available, suction available, emergency drugs available and hand hygiene performed  Peripheral Block  Block type: saphenous, adductor canal block  Prep: ChloraPrep  Patient position: sitting  Patient monitoring: cardiac monitor, continuous pulse oximetry, heart rate and blood pressure  Laterality: left  Injection technique: ultrasound guided    Ultrasound used to visualize needle placement in proximity to nerve being blocked: yes   US used to visualize anesthetic spread  Visualized anatomic structures normal  No Pathological Findings  Permanent ultrasound image captured for medical record    Needle  Needle type: Stimuplex   Needle gauge: 20G  Needle length: 4 in  no peripheral nerve catheter placed  Assessment  Injection assessment: no difficulty with injection, negative aspiration for heme, no paresthesia on injection and incremental injection

## 2021-06-08 NOTE — ANESTHESIA PREPROCEDURE EVALUATION
Anesthesia Evaluation      History of anesthetic complications (PONV)     Airway   Mallampati: I  Neck ROM: full   Pulmonary     breath sounds clear to auscultation  (-) not a smoker                         Cardiovascular   Exercise tolerance: > or = 4 METS  (+) hypertension, CAD (s/p remote PCI, stable, no angina\), , hypercholesterolemia,     ECG reviewed  Rhythm: regular  Rate: normal,         Neuro/Psych      Endo/Other    (+) diabetes mellitus type 2 using insulin, obesity (BMI 32),      GI/Hepatic/Renal      Comments: Celiac disease          Dental    (+) caps                         Anesthesia Plan  Planned anesthetic: MAC  LMA     ASA 3   Induction: intravenous   Anesthetic plan and risks discussed with: patient  Anesthesia plan special considerations: antiemetics,   Post-op plan: routine recovery

## 2021-06-08 NOTE — ANESTHESIA CARE TRANSFER NOTE
Last vitals:   Vitals:    06/17/20 1606   BP: 138/62   Pulse: 79   Resp:    Temp: 36.8  C (98.3  F)   SpO2: 96%     Patient's level of consciousness is drowsy  Spontaneous respirations: yes  Maintains airway independently: yes  Dentition unchanged: yes  Oropharynx: oropharynx clear of all foreign objects    QCDR Measures:  ASA# 20 - Surgical Safety Checklist: WHO surgical safety checklist completed prior to induction    PQRS# 430 - Adult PONV Prevention: 4558F - Pt received => 2 anti-emetic agents (different classes) preop & intraop  ASA# 8 - Peds PONV Prevention: NA - Not pediatric patient, not GA or 2 or more risk factors NOT present  PQRS# 424 - Afia-op Temp Management: 4559F - At least one body temp DOCUMENTED => 35.5C or 95.9F within required timeframe  PQRS# 426 - PACU Transfer Protocol: - Transfer of care checklist used  ASA# 14 - Acute Post-op Pain: ASA14B - Patient did NOT experience pain >= 7 out of 10

## 2021-06-08 NOTE — ANESTHESIA POSTPROCEDURE EVALUATION
Patient: Mica Lei  Procedure(s):  AMPUTATION, TOE, SECOND TOE (Right)  Anesthesia type: MAC    Patient location: PACU  Last vitals:   Vitals Value Taken Time   /72 6/17/2020  4:32 PM   Temp 36.8  C (98.3  F) 6/17/2020  4:06 PM   Pulse 78 6/17/2020  4:43 PM   Resp  6/17/2020  6:13 PM   SpO2 98 % 6/17/2020  4:43 PM   Vitals shown include unvalidated device data.  Post vital signs: stable  Level of consciousness: awake and responds to simple questions  Post-anesthesia pain: pain controlled  Post-anesthesia nausea and vomiting: no  Pulmonary: unassisted, return to baseline  Cardiovascular: stable and blood pressure at baseline  Hydration: adequate  Anesthetic events: no    QCDR Measures:  ASA# 11 - Afia-op Cardiac Arrest: ASA11B - Patient did NOT experience unanticipated cardiac arrest  ASA# 12 - Afia-op Mortality Rate: ASA12B - Patient did NOT die  ASA# 13 - PACU Re-Intubation Rate: ASA13B - Patient did NOT require a new airway mgmt  ASA# 10 - Composite Anes Safety: ASA10A - No serious adverse event    Additional Notes:

## 2021-09-19 ENCOUNTER — HEALTH MAINTENANCE LETTER (OUTPATIENT)
Age: 71
End: 2021-09-19

## 2021-11-14 ENCOUNTER — HEALTH MAINTENANCE LETTER (OUTPATIENT)
Age: 71
End: 2021-11-14

## 2022-05-01 ENCOUNTER — HEALTH MAINTENANCE LETTER (OUTPATIENT)
Age: 72
End: 2022-05-01

## 2022-06-26 ENCOUNTER — HEALTH MAINTENANCE LETTER (OUTPATIENT)
Age: 72
End: 2022-06-26

## 2022-11-21 ENCOUNTER — HEALTH MAINTENANCE LETTER (OUTPATIENT)
Age: 72
End: 2022-11-21

## 2022-12-25 ENCOUNTER — HEALTH MAINTENANCE LETTER (OUTPATIENT)
Age: 72
End: 2022-12-25

## 2023-01-23 ENCOUNTER — LAB REQUISITION (OUTPATIENT)
Dept: LAB | Facility: CLINIC | Age: 73
End: 2023-01-23

## 2023-01-23 DIAGNOSIS — N10 ACUTE PYELONEPHRITIS: ICD-10-CM

## 2023-01-23 LAB
AST SERPL W P-5'-P-CCNC: 14 U/L (ref 0–45)
BASOPHILS # BLD AUTO: 0 10E3/UL (ref 0–0.2)
BASOPHILS NFR BLD AUTO: 1 %
CREAT SERPL-MCNC: 0.64 MG/DL (ref 0.52–1.04)
EOSINOPHIL # BLD AUTO: 0.2 10E3/UL (ref 0–0.7)
EOSINOPHIL NFR BLD AUTO: 3 %
ERYTHROCYTE [DISTWIDTH] IN BLOOD BY AUTOMATED COUNT: 17.4 % (ref 10–15)
GFR SERPL CREATININE-BSD FRML MDRD: >90 ML/MIN/1.73M2
HCT VFR BLD AUTO: 28.6 % (ref 35–47)
HGB BLD-MCNC: 8.7 G/DL (ref 11.7–15.7)
IMM GRANULOCYTES # BLD: 0 10E3/UL
IMM GRANULOCYTES NFR BLD: 0 %
LYMPHOCYTES # BLD AUTO: 1.7 10E3/UL (ref 0.8–5.3)
LYMPHOCYTES NFR BLD AUTO: 29 %
MCH RBC QN AUTO: 25.4 PG (ref 26.5–33)
MCHC RBC AUTO-ENTMCNC: 30.4 G/DL (ref 31.5–36.5)
MCV RBC AUTO: 84 FL (ref 78–100)
MONOCYTES # BLD AUTO: 0.3 10E3/UL (ref 0–1.3)
MONOCYTES NFR BLD AUTO: 5 %
NEUTROPHILS # BLD AUTO: 3.7 10E3/UL (ref 1.6–8.3)
NEUTROPHILS NFR BLD AUTO: 62 %
NRBC # BLD AUTO: 0 10E3/UL
NRBC BLD AUTO-RTO: 0 /100
PLATELET # BLD AUTO: 267 10E3/UL (ref 150–450)
RBC # BLD AUTO: 3.42 10E6/UL (ref 3.8–5.2)
WBC # BLD AUTO: 6 10E3/UL (ref 4–11)

## 2023-01-23 PROCEDURE — 82565 ASSAY OF CREATININE: CPT | Performed by: INTERNAL MEDICINE

## 2023-01-23 PROCEDURE — 84450 TRANSFERASE (AST) (SGOT): CPT | Performed by: INTERNAL MEDICINE

## 2023-01-23 PROCEDURE — 85025 COMPLETE CBC W/AUTO DIFF WBC: CPT | Performed by: INTERNAL MEDICINE

## 2023-01-30 ENCOUNTER — HOME INFUSION (PRE-WILLOW HOME INFUSION) (OUTPATIENT)
Dept: PHARMACY | Facility: CLINIC | Age: 73
End: 2023-01-30

## 2023-02-02 NOTE — PROGRESS NOTES
This is a recent snapshot of the patient's [facility] medical record.  For current drug dose and complete information and questions, call [phone] or In Basket pool, [facility] ([id number])  CSN Number:  [id number]

## 2023-04-16 ENCOUNTER — HEALTH MAINTENANCE LETTER (OUTPATIENT)
Age: 73
End: 2023-04-16

## 2023-06-15 ENCOUNTER — TRANSCRIBE ORDERS (OUTPATIENT)
Dept: OTHER | Age: 73
End: 2023-06-15

## 2023-06-15 DIAGNOSIS — R29.898 WEAKNESS OF BOTH LOWER EXTREMITIES: ICD-10-CM

## 2023-06-15 DIAGNOSIS — R26.89 BALANCE PROBLEM: ICD-10-CM

## 2023-06-15 DIAGNOSIS — M62.81 GENERALIZED MUSCLE WEAKNESS: Primary | ICD-10-CM

## 2023-11-26 ENCOUNTER — HEALTH MAINTENANCE LETTER (OUTPATIENT)
Age: 73
End: 2023-11-26

## 2024-06-23 ENCOUNTER — HEALTH MAINTENANCE LETTER (OUTPATIENT)
Age: 74
End: 2024-06-23

## 2025-05-29 ENCOUNTER — TRANSCRIBE ORDERS (OUTPATIENT)
Dept: OTHER | Age: 75
End: 2025-05-29

## 2025-05-29 ENCOUNTER — MEDICAL CORRESPONDENCE (OUTPATIENT)
Dept: HEALTH INFORMATION MANAGEMENT | Facility: CLINIC | Age: 75
End: 2025-05-29
Payer: MEDICARE

## 2025-05-29 DIAGNOSIS — N32.81 OVERACTIVE BLADDER: Primary | ICD-10-CM

## 2025-06-02 ENCOUNTER — PATIENT OUTREACH (OUTPATIENT)
Dept: CARE COORDINATION | Facility: CLINIC | Age: 75
End: 2025-06-02
Payer: MEDICARE

## 2025-06-04 ENCOUNTER — PATIENT OUTREACH (OUTPATIENT)
Dept: CARE COORDINATION | Facility: CLINIC | Age: 75
End: 2025-06-04
Payer: MEDICARE